# Patient Record
Sex: MALE | URBAN - NONMETROPOLITAN AREA
[De-identification: names, ages, dates, MRNs, and addresses within clinical notes are randomized per-mention and may not be internally consistent; named-entity substitution may affect disease eponyms.]

---

## 2022-01-25 ENCOUNTER — TELEPHONE (OUTPATIENT)
Dept: NEUROLOGY | Age: 22
End: 2022-01-25

## 2022-01-25 DIAGNOSIS — R56.9 SEIZURE (HCC): ICD-10-CM

## 2022-01-25 NOTE — TELEPHONE ENCOUNTER
Called [facility] to get more information. Need more information for abstract and appointment.     12/15/2021  01/25/2022

## 2022-01-26 RX ORDER — KETOROLAC TROMETHAMINE 10 MG/1
10 TABLET, FILM COATED ORAL EVERY 6 HOURS PRN
COMMUNITY

## 2022-01-26 RX ORDER — PHENYTOIN SODIUM 300 MG/1
CAPSULE, EXTENDED RELEASE ORAL DAILY
COMMUNITY

## 2022-01-26 RX ORDER — CIPROFLOXACIN 500 MG/1
500 TABLET, FILM COATED ORAL DAILY
COMMUNITY

## 2022-01-27 ENCOUNTER — TELEPHONE (OUTPATIENT)
Dept: NEUROLOGY | Age: 22
End: 2022-01-27

## 2022-02-28 ENCOUNTER — TELEPHONE (OUTPATIENT)
Dept: NEUROLOGY | Age: 22
End: 2022-02-28

## 2025-07-14 ENCOUNTER — HOSPITAL ENCOUNTER (INPATIENT)
Age: 25
LOS: 2 days | Discharge: HOME | DRG: 881 | End: 2025-07-16
Attending: PSYCHIATRY & NEUROLOGY | Admitting: PSYCHIATRY & NEUROLOGY
Payer: COMMERCIAL

## 2025-07-14 PROBLEM — F32.A DEPRESSION: Status: ACTIVE | Noted: 2025-07-14

## 2025-07-14 PROBLEM — R45.851 DEPRESSION WITH SUICIDAL IDEATION: Status: ACTIVE | Noted: 2025-07-14

## 2025-07-14 LAB
HAV IGM SERPL QL IA: NONREACTIVE
HBV CORE IGM SERPL QL IA: NONREACTIVE
HBV SURFACE AG SERPL QL IA: NONREACTIVE
HCV AB SERPL QL IA: NONREACTIVE

## 2025-07-14 PROCEDURE — GZHZZZZ GROUP PSYCHOTHERAPY: ICD-10-PCS | Performed by: PSYCHIATRY & NEUROLOGY

## 2025-07-14 PROCEDURE — 93005 ELECTROCARDIOGRAM TRACING: CPT

## 2025-07-14 PROCEDURE — 80074 ACUTE HEPATITIS PANEL: CPT

## 2025-07-14 PROCEDURE — 83036 HEMOGLOBIN GLYCOSYLATED A1C: CPT

## 2025-07-14 PROCEDURE — 36415 COLL VENOUS BLD VENIPUNCTURE: CPT

## 2025-07-14 PROCEDURE — 1240000000 HC EMOTIONAL WELLNESS R&B

## 2025-07-14 PROCEDURE — 99232 SBSQ HOSP IP/OBS MODERATE 35: CPT | Performed by: INTERNAL MEDICINE

## 2025-07-14 PROCEDURE — GZ56ZZZ INDIVIDUAL PSYCHOTHERAPY, SUPPORTIVE: ICD-10-PCS | Performed by: PSYCHIATRY & NEUROLOGY

## 2025-07-14 PROCEDURE — 99223 1ST HOSP IP/OBS HIGH 75: CPT

## 2025-07-14 PROCEDURE — 6370000000 HC RX 637 (ALT 250 FOR IP): Performed by: PSYCHIATRY & NEUROLOGY

## 2025-07-14 RX ORDER — HYDROXYZINE HYDROCHLORIDE 50 MG/1
50 TABLET, FILM COATED ORAL 3 TIMES DAILY PRN
Status: DISCONTINUED | OUTPATIENT
Start: 2025-07-14 | End: 2025-07-16 | Stop reason: HOSPADM

## 2025-07-14 RX ORDER — POLYETHYLENE GLYCOL 3350 17 G/17G
17 POWDER, FOR SOLUTION ORAL DAILY PRN
Status: DISCONTINUED | OUTPATIENT
Start: 2025-07-14 | End: 2025-07-16 | Stop reason: HOSPADM

## 2025-07-14 RX ORDER — IBUPROFEN 400 MG/1
400 TABLET, FILM COATED ORAL EVERY 6 HOURS PRN
Status: DISCONTINUED | OUTPATIENT
Start: 2025-07-14 | End: 2025-07-16 | Stop reason: HOSPADM

## 2025-07-14 RX ORDER — FLUTICASONE PROPIONATE 50 MCG
2 SPRAY, SUSPENSION (ML) NASAL DAILY
Status: ON HOLD | COMMUNITY
Start: 2024-10-28 | End: 2025-07-16 | Stop reason: HOSPADM

## 2025-07-14 RX ORDER — MAGNESIUM HYDROXIDE/ALUMINUM HYDROXICE/SIMETHICONE 120; 1200; 1200 MG/30ML; MG/30ML; MG/30ML
30 SUSPENSION ORAL EVERY 6 HOURS PRN
Status: DISCONTINUED | OUTPATIENT
Start: 2025-07-14 | End: 2025-07-16 | Stop reason: HOSPADM

## 2025-07-14 RX ORDER — MONTELUKAST SODIUM 10 MG/1
10 TABLET ORAL DAILY
Status: ON HOLD | COMMUNITY
Start: 2024-10-28 | End: 2025-07-16 | Stop reason: HOSPADM

## 2025-07-14 RX ORDER — ALBUTEROL SULFATE 90 UG/1
2 INHALANT RESPIRATORY (INHALATION) EVERY 6 HOURS PRN
Status: DISCONTINUED | OUTPATIENT
Start: 2025-07-14 | End: 2025-07-16 | Stop reason: HOSPADM

## 2025-07-14 RX ORDER — PANTOPRAZOLE SODIUM 40 MG/1
40 TABLET, DELAYED RELEASE ORAL DAILY
Status: ON HOLD | COMMUNITY
End: 2025-07-16 | Stop reason: HOSPADM

## 2025-07-14 RX ORDER — CYCLOBENZAPRINE HCL 10 MG
10 TABLET ORAL 3 TIMES DAILY
Status: ON HOLD | COMMUNITY
Start: 2025-06-09 | End: 2025-07-16 | Stop reason: HOSPADM

## 2025-07-14 RX ORDER — ACETAMINOPHEN 325 MG/1
650 TABLET ORAL EVERY 6 HOURS PRN
Status: DISCONTINUED | OUTPATIENT
Start: 2025-07-14 | End: 2025-07-16 | Stop reason: HOSPADM

## 2025-07-14 RX ORDER — ALBUTEROL SULFATE 90 UG/1
2 INHALANT RESPIRATORY (INHALATION) EVERY 6 HOURS PRN
COMMUNITY
Start: 2024-10-28

## 2025-07-14 RX ORDER — TRAZODONE HYDROCHLORIDE 50 MG/1
50 TABLET ORAL NIGHTLY PRN
Status: DISCONTINUED | OUTPATIENT
Start: 2025-07-14 | End: 2025-07-16 | Stop reason: HOSPADM

## 2025-07-14 RX ORDER — HALOPERIDOL 5 MG/ML
5 INJECTION INTRAMUSCULAR EVERY 6 HOURS PRN
Status: DISCONTINUED | OUTPATIENT
Start: 2025-07-14 | End: 2025-07-16 | Stop reason: HOSPADM

## 2025-07-14 RX ORDER — HALOPERIDOL 5 MG/1
5 TABLET ORAL EVERY 6 HOURS PRN
Status: DISCONTINUED | OUTPATIENT
Start: 2025-07-14 | End: 2025-07-16 | Stop reason: HOSPADM

## 2025-07-14 RX ORDER — DIPHENHYDRAMINE HYDROCHLORIDE 50 MG/ML
50 INJECTION, SOLUTION INTRAMUSCULAR; INTRAVENOUS EVERY 6 HOURS PRN
Status: DISCONTINUED | OUTPATIENT
Start: 2025-07-14 | End: 2025-07-16 | Stop reason: HOSPADM

## 2025-07-14 RX ORDER — POLYETHYLENE GLYCOL 3350 17 G
2 POWDER IN PACKET (EA) ORAL
Status: DISCONTINUED | OUTPATIENT
Start: 2025-07-14 | End: 2025-07-16 | Stop reason: HOSPADM

## 2025-07-14 RX ADMIN — NICOTINE POLACRILEX 2 MG: 2 LOZENGE ORAL at 14:43

## 2025-07-14 RX ADMIN — NICOTINE POLACRILEX 2 MG: 2 LOZENGE ORAL at 17:28

## 2025-07-14 ASSESSMENT — PATIENT HEALTH QUESTIONNAIRE - PHQ9
2. FEELING DOWN, DEPRESSED OR HOPELESS: NOT AT ALL
SUM OF ALL RESPONSES TO PHQ QUESTIONS 1-9: 0
1. LITTLE INTEREST OR PLEASURE IN DOING THINGS: NOT AT ALL

## 2025-07-14 ASSESSMENT — LIFESTYLE VARIABLES
HOW MANY STANDARD DRINKS CONTAINING ALCOHOL DO YOU HAVE ON A TYPICAL DAY: PATIENT DOES NOT DRINK
HOW OFTEN DO YOU HAVE A DRINK CONTAINING ALCOHOL: NEVER
HOW OFTEN DO YOU HAVE A DRINK CONTAINING ALCOHOL: NEVER
HOW MANY STANDARD DRINKS CONTAINING ALCOHOL DO YOU HAVE ON A TYPICAL DAY: PATIENT DOES NOT DRINK

## 2025-07-14 ASSESSMENT — SLEEP AND FATIGUE QUESTIONNAIRES
DO YOU USE A SLEEP AID: NO
AVERAGE NUMBER OF SLEEP HOURS: 8
DO YOU HAVE DIFFICULTY SLEEPING: NO

## 2025-07-14 NOTE — GROUP NOTE
Group Therapy Note    Date: 7/14/2025    Group Start Time: 1100  Group End Time: 1150  Group Topic: Cognitive Skills    [facility]    Shanelle Gonsalves CTRS        Group Therapy Note    Attendees: 5/15       Patient's Goal: To increase social interaction, practice strategic thinking, and concentration skills.     Notes: Pt was able to practice strategic thinking, and concentration skills with some cues.    and was polite  with peers.        Status After Intervention:  Improved     Participation Level: Active Listener,  sharing, supportive     Participation Quality: Appropriate,  Attentive, sharing, supportive        Speech:  Normal     Thought Process/Content: Logical , pt needed cues at times due to extended periods of staring into space and not   attending to task/turn or hearing name called.     Affective Functioning: Blunted, appears preoccupied at times     Mood: Cooperative, preoccupied at times, blunted     Level of consciousness:  Alert, and Attentive        Response to Learning:  Able to verbalize some current knowledge, able to acknowledge new learning, and    Progressing to goal        Endings: None Reported     Modes of Intervention: Education, Support, Socialization, Exploration, Clarifying and Problem-solving        Discipline Responsible: Psychoeducational Specialist        Signature:  TONY ELY

## 2025-07-14 NOTE — GROUP NOTE
Group Therapy Note    Date: 7/14/2025    Group Start Time: 1445  Group End Time: 1545  Group Topic: Cognitive Skills    [facility]    Shanelle Gonsalves CTRS        Group Therapy Note    Attendees: 9/15     Patient's Goal:  To increase social interaction, practice self expression, and explore stress management   through the senses, and boundaries using creative expression and discussion       Notes: Pt participated in drawing during group but left group prior to sharing and di not stay to listen to peers' sharing or discussion. Pt is focused on being discharged and superficial in participation.        Status After Intervention:  Improved     Participation Level: Active Listener to social conversation while drawing,  superficial, preoccupied with being discharged     Participation Quality:  Active Listener to social conversation while drawing,  superficial, preoccupied with being discharged           Speech:  Superficial     Thought Process/Content: Logical, Linear.      Affective Functioning: Congruent, brightened     Mood: Euthymic , social with peers but evasive r/t group did not share r/t topic     Level of consciousness:  Alert, and Attentive        Response to Learning:  Able to verbalize current knowledge,     Progressing to goal        Endings: None Reported     Modes of Intervention: Education, Support, Socialization, Exploration, Clarifying and Problem-solving        Discipline Responsible: Psychoeducational Specialist        Signature:  TONY ELY

## 2025-07-14 NOTE — GROUP NOTE
Group Therapy Note    Date: 7/14/2025    Group Start Time: 1330  Group End Time: 1410  Group Topic: Cognitive Skills    [facility]    Shanelle Gonsalves CTRS        Group Therapy Note    Attendees: 7/14     Pt did not attend presentation by [facility] Coordinator at 1330, pt was seclusive to self and room.  Discipline Responsible: Psychoeducational Specialist      Signature:  TONY ELY

## 2025-07-14 NOTE — H&P
[facility] Internal Medicine  Bello Montes De Oca MD; Sotero Leija MD,, Norma Torres MD, Dr Padmaja Escalante MD   ; Yesenia Varner MD    [facility] Internal Medicine   IN-PATIENT SERVICE   [facility]     HISTORY AND PHYSICAL EXAMINATION            Date:   7/14/2025  Patient name:  Miles Juan  Date of admission:  7/14/2025 10:26 AM  MRN:   [id number]  Account:  [id number]  YOB: 2000  PCP:    No primary care provider on file.  Room:   [address] [place] [address]  Code Status:    Full Code      Chief Complaint:     Suicidal /Ac Psychosis    History Obtained From:     Patient/EMR/bedside RN     History of Present Illness:     Patient is a 24-year-old male past medical history of seizure disorder mild intermittent asthma has been admitted at [facility] for management of suicidal ideation    Past Medical History:     Past Medical History:   Diagnosis Date    Seizure (HCC) 1/25/2022        Past Surgical History:     No past surgical history on file.     Medications Prior to Admission:     Prior to Admission medications    Medication Sig Start Date End Date Taking? Authorizing Provider   albuterol sulfate HFA (PROVENTIL;VENTOLIN;PROAIR) 108 (90 Base) MCG/ACT inhaler Inhale 2 puffs into the lungs every 6 hours as needed 10/28/24  Yes Peg Doherty MD   cyclobenzaprine (FLEXERIL) 10 MG tablet Take 1 tablet by mouth 3 times daily 6/9/25 8/8/25 Yes Peg Doherty MD   fluticasone (FLONASE) 50 MCG/ACT nasal spray 2 sprays by Nasal route daily 10/28/24  Yes Peg Doherty MD   montelukast (SINGULAIR) 10 MG tablet Take 1 tablet by mouth daily 10/28/24 10/23/25 Yes ProviderPeg MD        Allergies:     Motrin [ibuprofen] and Tylenol [acetaminophen]    Social History:     Tobacco:    reports that he has been smoking cigarettes. He has never used smokeless tobacco.  Alcohol:      has no history on file for alcohol use.  Drug Use:  has no history on file for drug

## 2025-07-14 NOTE — BH NOTE
[facility]  Admission Note     Admission Type:   Admission Type: Involuntary    Reason for admission:  Reason for Admission: Pt was admitted due to having a stressors in his life. Per [facility], pt's wife is a high stressor for him and he was found [place] a bridge. Pt was talked down and brought to the hospital. On admission to the [facility], pt denies having homicidal or suicidal ideations. Pt stated he just went for a walk and had no intention of jumping. Pt reports that his wife called the police on him. States he is stressed out because he about to be going through divorce with her and is attempting to start a buisness. Pt was pleasant and cooperative      Addictive Behavior:   Addictive Behavior  In the Past 3 Months, Have You Felt or Has Someone Told You That You Have a Problem With  : None    Medical Problems:   Past Medical History:   Diagnosis Date    Seizure (HCC) 1/25/2022       Status EXAM:  Mental Status and Behavioral Exam  Normal: No  Level of Assistance: Independent/Self  Facial Expression: Flat  Affect: Blunt  Level of Consciousness: Alert  Frequency of Checks: 4 times per hour, close  Mood:Normal: No  Mood: Anxious, Irritable  Motor Activity:Normal: Yes  Eye Contact: Good  Observed Behavior: Preoccupied  Sexual Misconduct History: Current - no  Preception: [place] to person, [place] to time, [place] to place, [place] to situation  Attention:Normal: Yes  Thought Processes: Unremarkable  Thought Content:Normal: No  Thought Content: Preoccupations, Poverty of content  Depression Symptoms: No problems reported or observed.  Anxiety Symptoms: Generalized  [place] Symptoms: No problems reported or observed.  Hallucinations: None  Delusions: No  Memory:Normal: Yes  Insight and Judgment: No  Insight and Judgment: Poor judgment, Poor insight    Tobacco Screening:  Practical Counseling, on admission, diamond X, if applicable and completed (first 3 are required if patient doesn't refuse):            (

## 2025-07-14 NOTE — BH NOTE
Patient given tobacco quitline number 23274685936 at this time, refusing to call at this time, states \" I just dont want to quit now\"- patient given information as to the dangers of long term tobacco use. Continue to reinforce the importance of tobacco cessation.

## 2025-07-14 NOTE — CARE COORDINATION
[facility] Biopsychosocial Assessment    Current Level of Psychosocial Functioning     Independent xx  Dependent    Minimal Assist     Comments:    Psychosocial High Risk Factors (check all that apply)    Unable to obtain meds   Chronic illness/pain    Substance abuse   Lack of Family Support   Financial stress   Isolation   Inadequate Community Resources  Suicide attempt(s)  Not taking medications   Victim of crime   Developmental Delay  Unable to manage personal needs    Age 65 or older   Homeless  No transportation   Readmission within 30 days  Unemployment  Traumatic Event    Comments:   Psychiatric Advanced Directives: none identified     Family to Involve in Treatment: wife supportive     Sexual Orientation:  n/a    Patient Strengths: supportive wife, stable housing, employment     Patient Barriers: financial strain       Opiate Education Provided:  denies       CMHC/mental health history: linked with [facility]     Plan of Care   medication management, group/individual therapies, family meetings, psycho -education, treatment team meetings to assist with stabilization    Initial Discharge Plan:  return home       Clinical Summary:  Miles is a 24 year old single male who has been admitted to [facility] with report of interrupted suicide attempt to jump off bridge per chart report, during assessment he denies this as a suicide attempt. Miles reports he lives with his wife of five years and their children. He reports he works three jobs to support his family which has been increasingly taxing on his mental health. He is linked with a therapist, also a psychiatrist, however does that during televisit and does not know name of provider. He denies AOD issues, reports he is on probation. Writer offered ongoing support, encouragement and assist with community based resources.

## 2025-07-14 NOTE — H&P
Department of Psychiatry  Attending Physician Psychiatric Assessment   Patient: Miles Juan  MRN: [id number]  Reason for Admission to Psychiatric Unit:  Threat to self requiring 24 hour professional observation  A mental disorder causing major disability in social, interpersonal, occupational, and/or educational functioning that is leading to dangerous or life-threatening functioning, and that can only be addressed in an acute inpatient setting   Failure of outpatient psychiatry treatment so that the beneficiary requires 24 hour professional observation and care  Concerns about patient's safety in the community  Past Mental Health Diagnosis: a history of  Major Depression, Anxiety Disorder, and Prior suicide attempt  Triggering event or precipitating factor: Financial instability, Relationship Issues, and Psych Treatment Noncompliance  Length of time/duration of triggering event: Months  Legal Status: Involuntary    CHIEF COMPLAINT:  Depression with suicidal ideation    History obtained from: Patient, electronic medical record          HISTORY OF PRESENT ILLNESS:    Miles Juan is a 24 y.o. male who has a past medical history of seizure, depression, anxiety, and suicide attempt. Patient presented as a direct admission from [facility].  Per EMR documentation, patient was admitted due to having a stressors in his life. Per [facility], pt's wife is a high stressor for him and he was found [place] a bridge. Pt was talked down and brought to the hospital. On admission to the [facility], pt denies having homicidal or suicidal ideations. Pt stated he just went for a walk and had no intention of jumping. Pt reports that his wife called the police on him. States he is stressed out because he about to be going through divorce with her and is attempting to start a buisness. Pt was pleasant and cooperative.     He presents to the ED with concerns for suicide plan/attempt. The patient has significant stressors at home his wife

## 2025-07-15 LAB
EST. AVERAGE GLUCOSE BLD GHB EST-MCNC: 105 MG/DL
HBA1C MFR BLD: 5.3 % (ref 4–6)

## 2025-07-15 PROCEDURE — 1240000000 HC EMOTIONAL WELLNESS R&B

## 2025-07-15 PROCEDURE — 6370000000 HC RX 637 (ALT 250 FOR IP): Performed by: PSYCHIATRY & NEUROLOGY

## 2025-07-15 RX ORDER — SERTRALINE HYDROCHLORIDE 25 MG/1
25 TABLET, FILM COATED ORAL DAILY
Status: DISCONTINUED | OUTPATIENT
Start: 2025-07-15 | End: 2025-07-16 | Stop reason: HOSPADM

## 2025-07-15 RX ADMIN — NICOTINE POLACRILEX 2 MG: 2 LOZENGE ORAL at 07:39

## 2025-07-15 RX ADMIN — NICOTINE POLACRILEX 2 MG: 2 LOZENGE ORAL at 18:16

## 2025-07-15 RX ADMIN — NICOTINE POLACRILEX 2 MG: 2 LOZENGE ORAL at 20:04

## 2025-07-15 RX ADMIN — SERTRALINE HYDROCHLORIDE 25 MG: 25 TABLET ORAL at 12:07

## 2025-07-15 RX ADMIN — NICOTINE POLACRILEX 2 MG: 2 LOZENGE ORAL at 15:55

## 2025-07-15 RX ADMIN — NICOTINE POLACRILEX 2 MG: 2 LOZENGE ORAL at 12:08

## 2025-07-15 NOTE — GROUP NOTE
Group Therapy Note    Date: 7/15/2025    Group Start Time: 1000  Group End Time: 1030  Group Topic: Psychoeducation    [facility]    Marck Oliva        Group Therapy Note    Attendees: 8/18       Patient was offered group therapy today but declined to participate despite encouragement from staff. 1:1 was offered.    Signature:  Marck Oliva

## 2025-07-15 NOTE — GROUP NOTE
Group Therapy Note    Date: 7/15/2025    Group Start Time: 1100  Group End Time: 1150  Group Topic: Cognitive Skills    [facility]    Shanelle Gonsalves CTRS        Group Therapy Note    Attendees: 10/19     Topic: To increase social interaction, practice problem solving, and concentration skills.        Comments:   Patient did not participate in Cognitive Skills Group at 11:00, despite staff encouragement and explanation   of benefits. Pt is social with peers in dayroom but did not attend group.     Q15 minute safety checks maintained for patient safety and will continue to encourage patient to   attend unit programming.         Discipline Responsible: Psychoeducational Specialist   Signature: TONY ELY

## 2025-07-15 NOTE — PLAN OF CARE
Problem: Behavior  Goal: Pt/Family maintain appropriate behavior and adhere to behavioral management agreement, if implemented  Description: INTERVENTIONS:  1. Assess patient/family's coping skills and  non-compliant behavior (including use of illegal substances)  2. Notify security of behavior or suspected illegal substances which indicate the need for search of the family and/or belongings  3. Encourage verbalization of thoughts and concerns in a socially appropriate manner  4. Utilize positive, consistent limit setting strategies supporting safety of patient, staff and others  5. Encourage participation in the decision making process about the behavioral management agreement  6. If a visitor's behavior poses a threat to safety call refer to organization policy.  7. Initiate consult with , Psychosocial CNS, Spiritual Care as appropriate  Outcome: Progressing     Problem: Anxiety  Goal: Will report anxiety at manageable levels  Description: INTERVENTIONS:  1. Administer medication as ordered  2. Teach and rehearse alternative coping skills  3. Provide emotional support with 1:1 interaction with staff  Outcome: Progressing     Problem: Self Harm/Suicidality  Goal: Will have no self-injury during hospital stay  Description: INTERVENTIONS:  1.  Ensure constant observer at bedside with Q15M safety checks  2.  Maintain a safe environment  3.  Secure patient belongings  4.  Ensure family/visitors adhere to safety recommendations  5.  Ensure safety tray has been added to patient's diet order  6.  Every shift and PRN: Re-assess suicidal risk via Frequent Screener    Outcome: Progressing  Note: Patient denies thoughts to harm self and others. Patient was cooperative with assessment, he reports his mood is \"alright\". Patient was isolative to self this evening, patient encouraged to attend unit programming. Patient remains in control of behavior at this time. Safe environment maintained, will continue to offer

## 2025-07-15 NOTE — PLAN OF CARE
[facility]  Initial Interdisciplinary Treatment Plan Note      Original treatment plan Date & Time: 7/15/2025 1245    Admission Type:  Admission Type: Involuntary    Reason for admission:   Reason for Admission: Pt was admitted due to having a stressors in his life. Per [facility], pt's wife is a high stressor for him and he was found on a bridge. Pt was talked down and brought to the hospital. On admission to the [facility], pt denies having homicidal or suicidal ideations. Pt stated he just went for a walk and had no intention of jumping. Pt reports that his wife called the police on him. States he is stressed out because he about to be going through divorce with her and is attempting to start a buisness. Pt was pleasant and cooperative    Estimated Length of Stay:  5-7days  Estimated Discharge Date: To be determined by physician.    PATIENT STRENGTHS:  Patient Strengths:   Patient Strengths and Limitations:Limitations: Difficulty problem solving/relies on others to help solve problems  Addictive Behavior: Addictive Behavior  In the Past 3 Months, Have You Felt or Has Someone Told You That You Have a Problem With  : None  Medical Problems:  Past Medical History:   Diagnosis Date    Seizure (HCC) 1/25/2022     Status EXAM:Mental Status and Behavioral Exam  Normal: No  Level of Assistance: Independent/Self  Facial Expression: Flat  Affect: Blunt  Level of Consciousness: Alert  Frequency of Checks: 4 times per hour, close  Mood:Normal: No  Mood: Depressed, Anxious  Motor Activity:Normal: Yes  Eye Contact: Fair  Observed Behavior: Cooperative, Preoccupied  Sexual Misconduct History: Current - no  Preception: [place] to person, [place] to time, [place] to place, [place] to situation  Attention:Normal: Yes  Thought Processes: Unremarkable  Thought Content:Normal: No  Thought Content: Preoccupations  Depression Symptoms: Impaired concentration  Anxiety Symptoms: Generalized  [place] Symptoms: No problems

## 2025-07-15 NOTE — GROUP NOTE
Group Therapy Note    Date: 7/15/2025    Group Start Time: 1430  Group End Time: 1500  Group Topic: Nursing    [facility]    Sherri Botello LPN        Group Therapy Note    Attendees: 5/19       Patient was offered group therapy today but declined to participate despite encouragement from staff. 1:1 was offered.           Signature:  Sherri Botello LPN

## 2025-07-15 NOTE — GROUP NOTE
Group Therapy Note    Date: 7/14/2025    Group Start Time: 2000  Group End Time: 2020  Group Topic: Wrap-Up    [facility]        Group Therapy Note    Attendees: 6/14     Notes:  Patient decline to attend wrap-up group. 1:1 talk time offered as a alternative.     Signature:  Kristian Quigley RN

## 2025-07-15 NOTE — PLAN OF CARE
Problem: Anxiety  Goal: Will report anxiety at manageable levels  Description: INTERVENTIONS:  1. Administer medication as ordered  2. Teach and rehearse alternative coping skills  3. Provide emotional support with 1:1 interaction with staff  Outcome: Progressing     Problem: Risk for Elopement  Goal: Patient will not exit the unit/facility without proper excort  Outcome: Progressing  Note: Patient made no attempts to elope from unit      Problem: Self Harm/Suicidality  Goal: Will have no self-injury during hospital stay  Description: INTERVENTIONS:  1.  Ensure constant observer at bedside with Q15M safety checks  2.  Maintain a safe environment  3.  Secure patient belongings  4.  Ensure family/visitors adhere to safety recommendations  5.  Ensure safety tray has been added to patient's diet order  6.  Every shift and PRN: Re-assess suicidal risk via Frequent Screener    Outcome: Progressing  Note: Patient denies suicidal/homicidal ideation. Patient agreeable to seek out staff should thoughts of self harm/harming others arise. Patient denies hallucinations. Patient is positive for anxiety/depression but does not rate. Patient is pleasant, cooperative, social with peers, attends groups. Patient is medication compliant and behavior controlled. Comfort and reassurance provided. Safety checks maintained every 15 minutes and as needed.

## 2025-07-16 VITALS
HEIGHT: 73 IN | RESPIRATION RATE: 16 BRPM | HEART RATE: 54 BPM | TEMPERATURE: 97.5 F | BODY MASS INDEX: 33.13 KG/M2 | SYSTOLIC BLOOD PRESSURE: 130 MMHG | WEIGHT: 250 LBS | OXYGEN SATURATION: 100 % | DIASTOLIC BLOOD PRESSURE: 78 MMHG

## 2025-07-16 LAB
EKG ATRIAL RATE: 53 BPM
EKG P AXIS: 68 DEGREES
EKG P-R INTERVAL: 188 MS
EKG Q-T INTERVAL: 416 MS
EKG QRS DURATION: 88 MS
EKG QTC CALCULATION (BAZETT): 390 MS
EKG R AXIS: 82 DEGREES
EKG T AXIS: 30 DEGREES
EKG VENTRICULAR RATE: 53 BPM

## 2025-07-16 PROCEDURE — 99238 HOSP IP/OBS DSCHRG MGMT 30/<: CPT | Performed by: PSYCHIATRY & NEUROLOGY

## 2025-07-16 PROCEDURE — 6370000000 HC RX 637 (ALT 250 FOR IP): Performed by: PSYCHIATRY & NEUROLOGY

## 2025-07-16 RX ORDER — SERTRALINE HYDROCHLORIDE 25 MG/1
50 TABLET, FILM COATED ORAL DAILY
Qty: 30 TABLET | Refills: 0 | Status: SHIPPED | OUTPATIENT
Start: 2025-07-17

## 2025-07-16 RX ORDER — POLYETHYLENE GLYCOL 3350 17 G
2 POWDER IN PACKET (EA) ORAL
Qty: 100 EACH | Refills: 0 | Status: SHIPPED | OUTPATIENT
Start: 2025-07-16

## 2025-07-16 RX ADMIN — NICOTINE POLACRILEX 2 MG: 2 LOZENGE ORAL at 10:00

## 2025-07-16 RX ADMIN — SERTRALINE HYDROCHLORIDE 25 MG: 25 TABLET ORAL at 09:03

## 2025-07-16 RX ADMIN — NICOTINE POLACRILEX 2 MG: 2 LOZENGE ORAL at 07:04

## 2025-07-16 NOTE — BH NOTE
Smoking cessation counseling provided. Individualized cessation plan includes Nicotine lozenge

## 2025-07-16 NOTE — DISCHARGE INSTR - DIET

## 2025-07-16 NOTE — PLAN OF CARE
Problem: Risk for Elopement  Goal: Patient will not exit the unit/facility without proper excort  7/15/2025 2226 by Josiah Estrada, RN  Outcome: Progressing   he makes no attempt to leave    Problem: Self Harm/Suicidality  Goal: Will have no self-injury during hospital stay  Description: INTERVENTIONS:  1.  Ensure constant observer at bedside with Q15M safety checks  2.  Maintain a safe environment  3.  Secure patient belongings  4.  Ensure family/visitors adhere to safety recommendations  5.  Ensure safety tray has been added to patient's diet order  6.  Every shift and PRN: Re-assess suicidal risk via Frequent Screener    7/15/2025 2226 by Josiah Estrada, RN  Outcome: Progressing   Pt denies thoughts of harming themself and verbally agrees to remain safe while on the unit. No self harming behaviors are noted this shift    Problem: Behavior  Goal: Pt/Family maintain appropriate behavior and adhere to behavioral management agreement, if implemented  Description: INTERVENTIONS:  1. Assess patient/family's coping skills and  non-compliant behavior (including use of illegal substances)  2. Notify security of behavior or suspected illegal substances which indicate the need for search of the family and/or belongings  3. Encourage verbalization of thoughts and concerns in a socially appropriate manner  4. Utilize positive, consistent limit setting strategies supporting safety of patient, staff and others  5. Encourage participation in the decision making process about the behavioral management agreement  6. If a visitor's behavior poses a threat to safety call refer to organization policy.  7. Initiate consult with , Psychosocial CNS, Spiritual Care as appropriate  Outcome: Progressing   Pt is visible in the milieu social with staff and peers. He is pleasant and coopertaive  Problem: Anxiety  Goal: Will report anxiety at manageable levels  Description: INTERVENTIONS:  1. Administer medication as ordered  2.

## 2025-07-16 NOTE — GROUP NOTE
Group Therapy Note    Date: 7/16/2025    Group Start Time: 0800  Group End Time: 0810  Group Topic: Orientation Group    [facility]    Shireen Ball        Group Therapy Note    Attendees: 17/20         Patient's Goal:  review of staff, rules and schedule    Notes:      Status After Intervention:  Unchanged    Participation Level: Active Listener    Participation Quality: Appropriate      Speech:  normal      Thought Process/Content: Logical      Affective Functioning: Congruent      Mood: depressed      Level of consciousness:  Alert      Response to Learning: Able to verbalize current knowledge/experience and Progressing to goal      Endings: None Reported    Modes of Intervention: Education, Support, and Socialization      Discipline Responsible: Behavorial Health Tech      Signature:  Shireen Ball

## 2025-07-16 NOTE — DISCHARGE SUMMARY
Provider Discharge Summary     Patient ID:  Miles Juan  [id number]  24 y.o.  2000    Admit date: 7/14/2025    Discharge date and time: 7/16/2025  5:40 PM     Admitting Physician: Dutch Fox MD     Discharge Physician: Dutch Fox MD    Admission Diagnoses: Depression with suicidal ideation [F32.A, R45.851]    Discharge Diagnoses:      Depression with suicidal ideation     Patient Active Problem List   Diagnosis Code    Seizure (HCC) R56.9    Depression with suicidal ideation F32.A, R45.851        Admission Condition: poor    Discharged Condition: stable    Indication for Admission: threat to self    History of Present Illnes (present tense wording is of findings from admission exam and are not necessarily indicative of current findings):   Miles Juan is a 24 y.o. male who has a past medical history of seizure, depression, anxiety, and suicide attempt. Patient presented as a direct admission from [facility].  Per EMR documentation, patient was admitted due to having a stressors in his life. Per [facility], pt's wife is a high stressor for him and he was found [place] a bridge. Pt was talked down and brought to the hospital. On admission to the [facility], pt denies having homicidal or suicidal ideations. Pt stated he just went for a walk and had no intention of jumping. Pt reports that his wife called the police on him. States he is stressed out because he about to be going through divorce with her and is attempting to start a buisness. Pt was pleasant and cooperative.      He presents to the ED with concerns for suicide plan/attempt. The patient has significant stressors at home his wife is 51 and he is 24. They have combined 3 kids 1 child is at home. The patient states that the wife follows him everywhere (very controlling, demeaning to him), he also works 3 jobs and life stressors and work stressors were getting to him. He also went to snf recently and that affects him (this was due to incident

## 2025-07-16 NOTE — GROUP NOTE
Group Therapy Note    Date: 7/16/2025    Group Start Time: 1100  Group End Time: 1150  Group Topic: Cognitive Skills    [facility]    Shanelle Gonsalves CTRS        Group Therapy Note    Attendees: 14/19     Patient's Goal:  To increase social interaction, practice decision making and explore perception,   and communication skills.        Notes: Pt participated fully in group . Pt was able to practice decision making and explore perception,   and communication skills independently.        Status After Intervention:  Improved     Participation Level: Active Listener,  sharing, supportive     Participation Quality: Appropriate,  Attentive, sharing, supportive        Speech:  Normal      Thought Process/Content: Logical, Linear.      Affective Functioning: Congruent, brightened     Mood: Euthymic      Level of consciousness:  Alert, and Attentive        Response to Learning:  Able to verbalize current knowledge, able to acknowledge/verbalize new learning,    Progressing to goal        Endings: None Reported     Modes of Intervention: Education, Support, Socialization, Exploration, Clarifying and Problem-solving        Discipline Responsible: Psychoeducational Specialist        Signature:  TONY ELY

## 2025-07-16 NOTE — GROUP NOTE
Group Therapy Note    Date: 7/15/2025    Group Start Time: 2010  Group End Time: 2040  Group Topic: Wrap-Up    [facility]    Savannah Del Angel, RN      Group Therapy Note    Attendees: 8/18       Pt refused to attend Wrap-up/Goal Review group this evening after encouragement from staff.  1:1 talk time offered as alternative to group session, but pt declined.  Will continue to encourage patient to attend unit group programming.        Signature:  Savannah Del Angel, RN

## 2025-07-16 NOTE — BH NOTE
[facility] [place]  Discharge Note    Pt discharged with followings belongings:   Dental Appliances: None  Vision - Corrective Lenses: None  Hearing Aid: None  Jewelry: Necklace, Ring  Body Piercings Removed: N/A  Clothing: Undergarments, Socks, Shorts  Other Valuables: Other (Comment)   Valuables sent home with patient or returned to patient. Patient educated on aftercare instructions: Yes  Information faxed to [facility] by staff  at 12:24 PM .Patient verbalize understanding of AVS:  Yes.    Status EXAM upon discharge:  Mental Status and Behavioral Exam  Normal: No  Level of Assistance: Independent/Self  Facial Expression: Flat  Affect: Appropriate  Level of Consciousness: Alert  Frequency of Checks: 4 times per hour, close  Mood:Normal: No  Mood: Anxious  Motor Activity:Normal: Yes  Eye Contact: Good  Observed Behavior: Cooperative, Friendly, Preoccupied  Sexual Misconduct History: Current - no  Preception: [place] to person, [place] to time, [place] to place, [place] to situation  Attention:Normal: Yes  Thought Processes: Unremarkable  Thought Content:Normal: No  Thought Content: Preoccupations  Depression Symptoms: No problems reported or observed.  Anxiety Symptoms: Generalized  [place] Symptoms: No problems reported or observed.  Hallucinations: None  Delusions: No  Memory:Normal: Yes  Insight and Judgment: No  Insight and Judgment: Poor judgment, Poor insight    Tobacco Screening:  Practical Counseling, on admission, diamond X, if applicable and completed (first 3 are required if patient doesn't refuse)x:            (x) Recognizing danger situations (included triggers and roadblocks)                    (x) Coping skills (new ways to manage stress,relaxation techniques, changing routine, distraction)                                                           (x) Basic information about quitting (benefits of quitting, techniques in how to quit, available resources  ( ) Referral for counseling faxed to Tobacco

## 2025-07-16 NOTE — PROGRESS NOTES
Behavioral Services  Medicare Certification Upon Admission    I certify that this patient's inpatient psychiatric hospital admission is medically necessary for:    [x] (1) Treatment which could reasonably be expected to improve this patient's condition,       [x] (2) Or for diagnostic study;     AND     [x](2) The inpatient psychiatric services are provided while the individual is under the care of a physician and are included in the individualized plan of care.    Estimated length of stay/service 4-7 days    Plan for post-hospital care home with outpatient [facility] f/u    Electronically signed by MISAEL RILEY MD on 7/15/2025 at 7:56 AM      
CLINICAL PHARMACY NOTE: MEDS TO BEDS    Total # of Prescriptions Filled: 2   The following medications were delivered to the patient:  Sertraline 25mg  Nicotine 2mg lozenge    Additional Documentation: 7/16/25 11:27am domenico delivered to [facility]   
Pharmacy Medication History Note      List of current medications patient is taking is complete.    Source of information: PDMP, [facility], Care Everywhere, [facility], [facility], [facility]    Changes made to medication list:  Medications removed (include reason, ex. therapy complete or physician discontinued, noncompliance):  Toradol, ciprofloxacin, phenytoin - not current  medications, therapy completed    Medications flagged for provider review:  Pantoprazole - last filled in March - discontinued per PCP note  Albuterol - last filled in March  Singulair - reported in Care Everywhere but unable to verify fills at any pharmacy, PCP just recorded cyclobenzaprine  Cyclobenzaprine - PCP reported refills/active med in June but no pharmacy reports filling any medications since March  Fluticasone - reported in Care Everywhere but unable to verify fills at any pharmacy    Medications added/doses adjusted:  Pantoprazole - added, but last filled in March    Other notes (ex. Recent course of antibiotics, Coumadin dosing):  PDMP negative  PCP note reports patient using cyclobenzaprine - other medications listed from ED visits in Care Everywhere. PCP note states pantoprazole discontinued.   Patients PCP reports pt uses [facility] - they report he has not filled anything since March. Patient reports using [facility], they also report no fills since March. History suggestive of non-compliance.   [facility] reports history of Prozac and Keppra in 2022 and 2021.       Current Home Medication List at Time of Admission:  Prior to Admission medications    Medication Sig   pantoprazole (PROTONIX) 40 MG tablet Take 1 tablet by mouth daily   albuterol sulfate HFA (PROVENTIL;VENTOLIN;PROAIR) 108 (90 Base) MCG/ACT inhaler Inhale 2 puffs into the lungs every 6 hours as needed   cyclobenzaprine (FLEXERIL) 10 MG tablet Take 1 tablet by mouth 3 times daily   fluticasone (FLONASE) 50 MCG/ACT nasal spray 2 sprays by 
RT ASSESSMENT TREATMENT GOALS    [x]Pt Goal: Pt will identify 1-2 positive coping skills by time of discharge.    []Pt Goal: Pt will identify 1-2 positive aspects of self by time of discharge.    [x]Pt Goal: Pt will remain on task/topic for 15-30 minutes during group by time of discharge.    []Pt Goal: Pt will identify 1-2 aspects of relapse prevention plan by time of discharge.    []Pt Goal: Pt will join in conversation with peers 1-2 times per group by time of discharge.    []Pt Goal: Pt will identify 1-2 new leisure interests by time of discharge.    []Pt Goal: Pt will not voice any delusional content by time of discharge.    
Inhalation, Q6H PRN    ASSESSMENT  Depression with suicidal ideation     PLAN  Patient s symptoms   are improving  Medications Changed Today.  A discussion of risks, benefits, and alternatives was held with the patient and this provider with regards to medication changes.  After this discussion we mutually agreed to proceed with the medication changes.   Start zoloft  Attempt to develop insight  Psycho-education conducted.  Supportive Therapy conducted.  Probable discharge is tomorrow  Follow-up daily    Spent 17 minutes or more with the patient in  Supportive psychotherapy.      Electronically signed by MISAEL RILEY MD on 7/15/25 at 9:14 PM EDT    **This report has been created using voice recognition software. It may contain minor errors which are inherent in voice recognition technology.**

## 2025-07-16 NOTE — GROUP NOTE
Group Therapy Note    Date: 7/16/2025    Group Start Time: 0900  Group End Time: 0935  Group Topic: Community Meeting    [facility]    Shireen Ball        Group Therapy Note    Attendees: 9/20     Community Meeting Group Note        Date: July 16, 2025 Start Time: 9am  End Time: 0935      Number of Participants in Group & Unit Census:  9/20    Topic: Goals    Goal of Group: Set short term goal for the day      Comments:     Patient did not participate in Community Meeting group, despite staff encouragement and explanation of benefits.  Patient remain seclusive to self.  Q15 minute safety checks maintained for patient safety and will continue to encourage patient to attend unit programming.

## 2025-07-16 NOTE — CARE COORDINATION
Name: Miles Juan    : 2000    Auth number: [id number]     Discharge Date: 2025    Destination: home    *If you have any specific discharge questions, please contact the assigned /discharge planner: Darlyn ([phone])       Discharge Medications:      Medication List        START taking these medications      nicotine polacrilex 2 MG lozenge  Commonly known as: COMMIT  Take 1 lozenge by mouth every 2 hours as needed for Smoking cessation  Notes to patient: Nicotine cessation      sertraline 25 MG tablet  Commonly known as: ZOLOFT  Take 2 tablets by mouth daily  Start taking on: 2025  Notes to patient: Mood stabilizer             CONTINUE taking these medications      albuterol sulfate  (90 Base) MCG/ACT inhaler  Commonly known as: PROVENTIL;VENTOLIN;PROAIR  Notes to patient: Lung maintenance             STOP taking these medications      ciprofloxacin 500 MG tablet  Commonly known as: CIPRO     cyclobenzaprine 10 MG tablet  Commonly known as: FLEXERIL     fluticasone 50 MCG/ACT nasal spray  Commonly known as: FLONASE     ketorolac 10 MG tablet  Commonly known as: TORADOL     montelukast 10 MG tablet  Commonly known as: SINGULAIR     pantoprazole 40 MG tablet  Commonly known as: PROTONIX               Where to Get Your Medications        These medications were sent to [id number] - [address] -  [phone] - F [phone]  [address] [place] [address]      Phone: [phone]   nicotine polacrilex 2 MG lozenge  sertraline 25 MG tablet         Follow Up Appointment: [facility]  [address] [place] [address]   [phone]  [phone]  Follow up on 2025  @2:00pm for mental health/therapy followup; the agency will send you an email with a link to the online portal where you can complete the intake forms online. You may not be seen for this appointment if you do not complete the online

## 2025-07-16 NOTE — TRANSITION OF CARE
these advance directives Patient will complete at a later time    Patient Instructions: Please continue all medications until otherwise directed by physician.  Dominique     Tobacco Cessation Discharge Plan:   Is the patient a tobacco user  and needs referral for tobacco cessation? Yes  Patient referred to the following for tobacco cessation with an appointment? No  Patient was offered medication to assist with tobacco cessation at discharge? Yes    Alcohol/Substance Abuse Discharge Plan:   Does the patient have a history of substance/alcohol abuse and requires a referral for treatment? No  Patient referred to the following for substance/alcohol abuse treatment with an appointment? No  Patient was offered medication to assist with substance/alcohol abuse cessation at discharge? No      Patient discharged to: Home; Transition record discussed with patient/caregiver and provided this record in hard copy or electronically

## 2025-07-16 NOTE — DISCHARGE INSTRUCTIONS
Information:  Medications:   Medication summary provided   I understand that I should take only the medications on my list.     -why and when I need to take each medicine.     -which side effects to watch for.     -that I should carry my medication information at all times in case of     Emergency situations.    I will take all of my medicines to follow up appointments.     -check with my physician or pharmacist before taking any new    Medication, over the counter product or drink alcohol.    -Ask about food, drug or dietary supplement interactions.    -discard old lists and update records with medication providers.    Notify Physician:  Notify physician if you notice:   Always call 911 if you feel your life is in danger  In case of an emergency call 911 immediately!  If 911 is not available call your local emergency medical system for help    Behavioral Health Follow Up:  Original Referral Source:[facility] Diagnosis: Depression with suicidal ideation [F32.A, R45.851]  Recommendations for Level of Care: Take medications as prescribed. Keep all follow up appointments   Patient status at discharge: Stable. Alert and oriented   My hospital  was: Darlyn  Aftercare plan faxed: [facility]    -faxed by: Staff    -date: 7/16/2025   -time: 1219  Prescriptions: Sent with patient     Smoking: Quit Smoking.   Call the [employer]'s smoking quitline at 9-171-27T-QUIT  Know the signs of a heart attack   If you have any of the following symptoms call 911 immediately, do not wait more    Than five minutes.    1. Pressure, fullness and/ or squeezing in the center of the chest spreading to    The jaw, neck or shoulder.    2. Chest discomfort with light headedness, fainting, sweating, nausea or    Shortness of breath.   3. Upper abdominal pressure or discomfort.   4. Lower chest pain, back pain, unusual fatigue, shortness of breath, nausea   Or dizziness.     General

## 2025-07-16 NOTE — CARE COORDINATION
Discharge Arrangements:  N/A    Guardian notified: n/a    Discharge destination/address: [facility]     Transported by:  FAMILY     Miles was accepting of his mental health followup appointment   [facility] 07/21/2025 at 2pm  [facility] 07/23/25 at 10am phone intake  [facility] 07/24/25 at 1230pm mental health intake     *[facility] resources were offered to patient throughout admission and at time of discharge. This list of [facility] providers was provided to patient:     [facility]  [address] [facility] [address]   [address] [facility] [address]  Phone: [phone]     Phone: [phone]    [facility]   [address] [facility] [address]   [address] [facility] [address]  Phone: [phone]     Phone: [phone]    [facility], [address] [address]    [address] [facility] [facility] [facility] [address]  Phone: [phone]     Phone: [phone]    [facility]     [facility]   [address] [facility] [address]  [place] [address]      Phone: [phone]  Phone: [phone]        [facility]  [address] [facility] [address] [facility] [facility] [address]   [address] [place] [address]  Phone: [phone]     Phone:  [phone]    [employer]      [facility]  [address] [facility] [address]   [address] [facility] [address]  Phone: [phone]     Phone: [phone]    [facility]  [address] [facility] [address]   [address] [facility] [address]  Phone: [phone]     Phone: [phone]    [facility]  [facility]  [address] [facility] [address] [address] [place] [address]  Phone: [phone]     Phone: [phone]    [facility]     [facility]